# Patient Record
(demographics unavailable — no encounter records)

---

## 2025-06-03 NOTE — PHYSICAL EXAM
[Well Developed] : well developed [Well Nourished] : well nourished [No Acute Distress] : no acute distress [Normal Conjunctiva] : normal conjunctiva [Normal Venous Pressure] : normal venous pressure [No Carotid Bruit] : no carotid bruit [Normal S1, S2] : normal S1, S2 [No Murmur] : no murmur [No Rub] : no rub [No Gallop] : no gallop [Clear Lung Fields] : clear lung fields [Good Air Entry] : good air entry [No Respiratory Distress] : no respiratory distress  [Soft] : abdomen soft [Non Tender] : non-tender [No Edema] : no edema [Normal Speech] : normal speech [Alert and Oriented] : alert and oriented

## 2025-06-03 NOTE — HISTORY OF PRESENT ILLNESS
[FreeTextEntry1] : Patient is a 45yo female with h/o DM presenting for evaluation. She wants to get her heart checked. She gets SOB walking up stairs, but otherwise denies CP/ LH/syncope/palpitations. No tob/etoh/drugs. Takes her meds as prescribed. Working on lowering her A1C and improving DM control. Does not exercise regularly. No prior cardiac testing.

## 2025-06-03 NOTE — ASSESSMENT
[FreeTextEntry1] : 45yo female with h/o DM presenting for evaluation and risk stratification. 1. Risk stratification. plan for Calcium Score. 2. DM. Add statin and f/u lipid profile with PCP.  Recommend lifestyle modification to continue to work on improving A1C. 3. SOB. Obtain exercise stress test.